# Patient Record
Sex: FEMALE | Race: WHITE | Employment: UNEMPLOYED | URBAN - METROPOLITAN AREA
[De-identification: names, ages, dates, MRNs, and addresses within clinical notes are randomized per-mention and may not be internally consistent; named-entity substitution may affect disease eponyms.]

---

## 2017-09-19 ENCOUNTER — APPOINTMENT (OUTPATIENT)
Dept: GENERAL RADIOLOGY | Facility: HOSPITAL | Age: 37
End: 2017-09-19

## 2017-09-19 ENCOUNTER — HOSPITAL ENCOUNTER (OUTPATIENT)
Facility: HOSPITAL | Age: 37
Setting detail: OBSERVATION
Discharge: HOME | End: 2017-09-20
Admitting: HOSPITALIST

## 2017-09-19 DIAGNOSIS — R94.31 ABNORMAL ECG: ICD-10-CM

## 2017-09-19 DIAGNOSIS — R00.2 PALPITATIONS: Primary | ICD-10-CM

## 2017-09-19 LAB
ANION GAP SERPL CALC-SCNC: 8 MMOL/L (ref 0–18)
BASOPHILS # BLD: 0.1 K/UL (ref 0–0.2)
BASOPHILS NFR BLD: 1 %
BUN SERPL-MCNC: 7 MG/DL (ref 8–20)
BUN/CREAT SERPL: 10.1 (ref 10–20)
CALCIUM SERPL-MCNC: 8.8 MG/DL (ref 8.5–10.5)
CHLORIDE SERPL-SCNC: 102 MMOL/L (ref 95–110)
CHOLEST SERPL-MCNC: 149 MG/DL (ref 110–200)
CO2 SERPL-SCNC: 24 MMOL/L (ref 22–32)
CREAT SERPL-MCNC: 0.69 MG/DL (ref 0.5–1.5)
D DIMER PPP FEU-MCNC: <0.27 MCG/ML (ref ?–0.5)
EOSINOPHIL # BLD: 0.1 K/UL (ref 0–0.7)
EOSINOPHIL NFR BLD: 1 %
ERYTHROCYTE [DISTWIDTH] IN BLOOD BY AUTOMATED COUNT: 12.6 % (ref 11–15)
GLUCOSE SERPL-MCNC: 103 MG/DL (ref 70–99)
HCT VFR BLD AUTO: 42.9 % (ref 35–48)
HDLC SERPL-MCNC: 50 MG/DL
HGB BLD-MCNC: 14.8 G/DL (ref 12–16)
LDLC SERPL CALC-MCNC: 87 MG/DL (ref 0–99)
LYMPHOCYTES # BLD: 1.9 K/UL (ref 1–4)
LYMPHOCYTES NFR BLD: 24 %
MCH RBC QN AUTO: 30.8 PG (ref 27–32)
MCHC RBC AUTO-ENTMCNC: 34.5 G/DL (ref 32–37)
MCV RBC AUTO: 89.3 FL (ref 80–100)
MONOCYTES # BLD: 0.5 K/UL (ref 0–1)
MONOCYTES NFR BLD: 7 %
NEUTROPHILS # BLD AUTO: 5.5 K/UL (ref 1.8–7.7)
NEUTROPHILS NFR BLD: 68 %
NONHDLC SERPL-MCNC: 99 MG/DL
OSMOLALITY UR CALC.SUM OF ELEC: 276 MOSM/KG (ref 275–295)
PLATELET # BLD AUTO: 169 K/UL (ref 140–400)
PMV BLD AUTO: 8.2 FL (ref 7.4–10.3)
POTASSIUM SERPL-SCNC: 3.8 MMOL/L (ref 3.3–5.1)
RBC # BLD AUTO: 4.8 M/UL (ref 3.7–5.4)
SODIUM SERPL-SCNC: 134 MMOL/L (ref 136–144)
TRIGL SERPL-MCNC: 61 MG/DL (ref 1–149)
TROPONIN I SERPL-MCNC: 0 NG/ML (ref ?–0.03)
TROPONIN I SERPL-MCNC: 0 NG/ML (ref ?–0.03)
WBC # BLD AUTO: 8.1 K/UL (ref 4–11)

## 2017-09-19 PROCEDURE — 96360 HYDRATION IV INFUSION INIT: CPT

## 2017-09-19 PROCEDURE — 96361 HYDRATE IV INFUSION ADD-ON: CPT

## 2017-09-19 PROCEDURE — 93010 ELECTROCARDIOGRAM REPORT: CPT | Performed by: NURSE PRACTITIONER

## 2017-09-19 PROCEDURE — 85379 FIBRIN DEGRADATION QUANT: CPT | Performed by: NURSE PRACTITIONER

## 2017-09-19 PROCEDURE — 99285 EMERGENCY DEPT VISIT HI MDM: CPT

## 2017-09-19 PROCEDURE — 71020 XR CHEST PA + LAT CHEST (CPT=71020): CPT

## 2017-09-19 PROCEDURE — 93010 ELECTROCARDIOGRAM REPORT: CPT | Performed by: INTERNAL MEDICINE

## 2017-09-19 PROCEDURE — 85025 COMPLETE CBC W/AUTO DIFF WBC: CPT

## 2017-09-19 PROCEDURE — 80048 BASIC METABOLIC PNL TOTAL CA: CPT

## 2017-09-19 PROCEDURE — 80061 LIPID PANEL: CPT | Performed by: HOSPITALIST

## 2017-09-19 PROCEDURE — 93005 ELECTROCARDIOGRAM TRACING: CPT

## 2017-09-19 PROCEDURE — 84484 ASSAY OF TROPONIN QUANT: CPT

## 2017-09-19 RX ORDER — HEPARIN SODIUM 5000 [USP'U]/ML
5000 INJECTION, SOLUTION INTRAVENOUS; SUBCUTANEOUS EVERY 12 HOURS SCHEDULED
Status: DISCONTINUED | OUTPATIENT
Start: 2017-09-19 | End: 2017-09-20

## 2017-09-19 RX ORDER — PAROXETINE 30 MG/1
30 TABLET, FILM COATED ORAL EVERY MORNING
COMMUNITY
End: 2017-09-20

## 2017-09-19 RX ORDER — ASPIRIN 325 MG
325 TABLET ORAL ONCE
Status: COMPLETED | OUTPATIENT
Start: 2017-09-19 | End: 2017-09-19

## 2017-09-19 RX ORDER — ACETAMINOPHEN 325 MG/1
650 TABLET ORAL EVERY 6 HOURS PRN
Status: DISCONTINUED | OUTPATIENT
Start: 2017-09-19 | End: 2017-09-20

## 2017-09-19 RX ORDER — NICOTINE 21 MG/24HR
1 PATCH, TRANSDERMAL 24 HOURS TRANSDERMAL DAILY
Status: DISCONTINUED | OUTPATIENT
Start: 2017-09-19 | End: 2017-09-20

## 2017-09-19 RX ORDER — POTASSIUM CHLORIDE 20 MEQ/1
40 TABLET, EXTENDED RELEASE ORAL ONCE
Status: COMPLETED | OUTPATIENT
Start: 2017-09-19 | End: 2017-09-19

## 2017-09-19 RX ORDER — SODIUM CHLORIDE 0.9 % (FLUSH) 0.9 %
3 SYRINGE (ML) INJECTION AS NEEDED
Status: DISCONTINUED | OUTPATIENT
Start: 2017-09-19 | End: 2017-09-20

## 2017-09-19 RX ORDER — METHADONE HYDROCHLORIDE 10 MG/1
58 TABLET ORAL DAILY
COMMUNITY

## 2017-09-19 RX ORDER — ONDANSETRON 2 MG/ML
4 INJECTION INTRAMUSCULAR; INTRAVENOUS EVERY 6 HOURS PRN
Status: DISCONTINUED | OUTPATIENT
Start: 2017-09-19 | End: 2017-09-20

## 2017-09-19 RX ORDER — DOXEPIN HYDROCHLORIDE 50 MG/1
1 CAPSULE ORAL DAILY
COMMUNITY

## 2017-09-19 RX ORDER — SODIUM CHLORIDE 9 MG/ML
INJECTION, SOLUTION INTRAVENOUS CONTINUOUS
Status: DISCONTINUED | OUTPATIENT
Start: 2017-09-19 | End: 2017-09-20

## 2017-09-19 NOTE — H&P
[facility] Hospitalist H&P       CC: Patient presents with:  Arrythmia/Palpitations (cardiovascular)     PCP: None Pcp    ASSESSMENT / PLAN:     Ms. Ayse Schmid julia  30 yo F with with PMH of prior opioid abuse, now on methadone and anxiety who presented with palpit have sinus tachycardia in EMS but NSR initially in ED. Feels like HR has improved since arriving to ED. Troponin negative, initial EKG with NSR, repeat EKG with short KS. Cardiology called NP and recommended further evaluation.  No family hx of sudden cardi 8.8   NA  134*   K  3.8   CL  102   CO2  24       Lab Results  Component Value Date   WBC 8.1 09/19/2017   HGB 14.8 09/19/2017   HCT 42.9 09/19/2017    09/19/2017   CREATSERUM 0.69 09/19/2017   BUN 7 09/19/2017    09/19/2017   K 3.8 09/19/2017

## 2017-09-19 NOTE — ED NOTES
Pt states took first dose of Paxil this AM at 0700, fell asleep and when woke felt heart racing, clammy, and lightheaded. Arrived to triage per EMS, states feels improvement but not completely.

## 2017-09-19 NOTE — ED PROVIDER NOTES
Patient Seen in: [facility] AND [facility] Emergency Department    History   CC: rapid heart beat  HPI: Eliel Rondon 29year old female with a history of anxiety, depression and opiate addiction, currently on methadone who presents to the ER c/o feeling as th Vitals [09/19/17 1347]  BP: 139/64  Pulse: 70  Resp: 18  Temp: 98.3 °F (36.8 °C)  Temp src: Oral  SpO2: 98 %  O2 Device: None (Room air)    Current:/74   Pulse 98   Temp 98.3 °F (36.8 °C) (Oral)   Resp 10   Ht 170.2 cm (5' 7\")   Wt 61.2 kg   LMP 08/ normal sinus rhythm and sinus tachycardia as high as 120bpm.  Patient is not orthostatic in the emergency department. Normal trop x2 in the ER, however EKG 2hr shows subtle st depression in inferolateral leads.  Dr. Junito Washington, cardiology called to state pt also

## 2017-09-19 NOTE — ED INITIAL ASSESSMENT (HPI)
Pt presents to ED via EMS for c/o rapid HR. Pt states she took first dose of plaxil (30 mg). Felt clammy and generally weak after taking med. Per EMS pt was sinus tach but returned to NSR before arrival to ED. BP stable.

## 2017-09-20 ENCOUNTER — APPOINTMENT (OUTPATIENT)
Dept: CV DIAGNOSTICS | Facility: HOSPITAL | Age: 37
End: 2017-09-20
Attending: INTERNAL MEDICINE

## 2017-09-20 VITALS
OXYGEN SATURATION: 96 % | WEIGHT: 129.63 LBS | DIASTOLIC BLOOD PRESSURE: 78 MMHG | RESPIRATION RATE: 18 BRPM | TEMPERATURE: 98 F | BODY MASS INDEX: 20.35 KG/M2 | HEART RATE: 83 BPM | HEIGHT: 67 IN | SYSTOLIC BLOOD PRESSURE: 129 MMHG

## 2017-09-20 LAB
ANION GAP SERPL CALC-SCNC: 5 MMOL/L (ref 0–18)
BASOPHILS # BLD: 0.1 K/UL (ref 0–0.2)
BASOPHILS NFR BLD: 1 %
BUN SERPL-MCNC: 6 MG/DL (ref 8–20)
BUN/CREAT SERPL: 7.7 (ref 10–20)
CALCIUM SERPL-MCNC: 8.6 MG/DL (ref 8.5–10.5)
CHLORIDE SERPL-SCNC: 107 MMOL/L (ref 95–110)
CHOLEST SERPL-MCNC: 143 MG/DL (ref 110–200)
CO2 SERPL-SCNC: 27 MMOL/L (ref 22–32)
CREAT SERPL-MCNC: 0.78 MG/DL (ref 0.5–1.5)
EOSINOPHIL # BLD: 0.1 K/UL (ref 0–0.7)
EOSINOPHIL NFR BLD: 1 %
ERYTHROCYTE [DISTWIDTH] IN BLOOD BY AUTOMATED COUNT: 12.7 % (ref 11–15)
GLUCOSE SERPL-MCNC: 87 MG/DL (ref 70–99)
HCT VFR BLD AUTO: 41.2 % (ref 35–48)
HDLC SERPL-MCNC: 47 MG/DL
HGB BLD-MCNC: 13.9 G/DL (ref 12–16)
LDLC SERPL CALC-MCNC: 81 MG/DL (ref 0–99)
LYMPHOCYTES # BLD: 3.1 K/UL (ref 1–4)
LYMPHOCYTES NFR BLD: 30 %
MAGNESIUM SERPL-MCNC: 1.8 MG/DL (ref 1.8–2.5)
MCH RBC QN AUTO: 30.2 PG (ref 27–32)
MCHC RBC AUTO-ENTMCNC: 33.8 G/DL (ref 32–37)
MCV RBC AUTO: 89.5 FL (ref 80–100)
MONOCYTES # BLD: 0.9 K/UL (ref 0–1)
MONOCYTES NFR BLD: 9 %
NEUTROPHILS # BLD AUTO: 6.1 K/UL (ref 1.8–7.7)
NEUTROPHILS NFR BLD: 59 %
NONHDLC SERPL-MCNC: 96 MG/DL
OSMOLALITY UR CALC.SUM OF ELEC: 285 MOSM/KG (ref 275–295)
PLATELET # BLD AUTO: 164 K/UL (ref 140–400)
PMV BLD AUTO: 9.3 FL (ref 7.4–10.3)
POTASSIUM SERPL-SCNC: 4.1 MMOL/L (ref 3.3–5.1)
POTASSIUM SERPL-SCNC: 4.1 MMOL/L (ref 3.3–5.1)
RBC # BLD AUTO: 4.61 M/UL (ref 3.7–5.4)
SODIUM SERPL-SCNC: 139 MMOL/L (ref 136–144)
TRIGL SERPL-MCNC: 73 MG/DL (ref 1–149)
TROPONIN I SERPL-MCNC: 0 NG/ML (ref ?–0.03)
TROPONIN I SERPL-MCNC: 0 NG/ML (ref ?–0.03)
TSH SERPL-ACNC: 1.19 UIU/ML (ref 0.45–5.33)
WBC # BLD AUTO: 10.3 K/UL (ref 4–11)

## 2017-09-20 PROCEDURE — 93306 TTE W/DOPPLER COMPLETE: CPT | Performed by: INTERNAL MEDICINE

## 2017-09-20 PROCEDURE — 84484 ASSAY OF TROPONIN QUANT: CPT | Performed by: INTERNAL MEDICINE

## 2017-09-20 PROCEDURE — 83735 ASSAY OF MAGNESIUM: CPT | Performed by: INTERNAL MEDICINE

## 2017-09-20 PROCEDURE — 93005 ELECTROCARDIOGRAM TRACING: CPT

## 2017-09-20 PROCEDURE — 84132 ASSAY OF SERUM POTASSIUM: CPT | Performed by: HOSPITALIST

## 2017-09-20 PROCEDURE — 80048 BASIC METABOLIC PNL TOTAL CA: CPT | Performed by: HOSPITALIST

## 2017-09-20 PROCEDURE — 93010 ELECTROCARDIOGRAM REPORT: CPT | Performed by: INTERNAL MEDICINE

## 2017-09-20 PROCEDURE — 80061 LIPID PANEL: CPT | Performed by: INTERNAL MEDICINE

## 2017-09-20 PROCEDURE — 84443 ASSAY THYROID STIM HORMONE: CPT | Performed by: INTERNAL MEDICINE

## 2017-09-20 PROCEDURE — 85025 COMPLETE CBC W/AUTO DIFF WBC: CPT | Performed by: HOSPITALIST

## 2017-09-20 RX ORDER — METHADONE HYDROCHLORIDE 10 MG/5ML
58 SOLUTION ORAL DAILY
Status: DISCONTINUED | OUTPATIENT
Start: 2017-09-20 | End: 2017-09-20

## 2017-09-20 RX ORDER — METHADONE HYDROCHLORIDE 10 MG/1
58 TABLET ORAL DAILY
Status: DISCONTINUED | OUTPATIENT
Start: 2017-09-20 | End: 2017-09-20

## 2017-09-20 RX ORDER — MAGNESIUM OXIDE 400 MG (241.3 MG MAGNESIUM) TABLET
400 TABLET ONCE
Status: COMPLETED | OUTPATIENT
Start: 2017-09-20 | End: 2017-09-20

## 2017-09-20 NOTE — PROGRESS NOTES
[facility]    Progress Note    Ish Jones Patient Status:  Observation    1980 MRN [id number]   Location [facility] Attending Morgan Tyler MD   Hosp Day # 0 PCP No primary care provider on file.          Assessme 4.1 09/20/2017    09/20/2017   CO2 27 09/20/2017   GLU 87 09/20/2017   CA 8.6 09/20/2017   TSH 1.19 09/20/2017   DDIMER <0.27 09/19/2017   MG 1.8 09/20/2017   TROP 0.00 09/20/2017       Xr Chest Pa + Lat Chest ([id number])    Result Date: 9/19/2017  C

## 2017-09-20 NOTE — DISCHARGE SUMMARY
General Medicine Discharge Summary     Patient ID:  Chelsea Nettles  29year old  11/20/1980    Admit date: 9/19/2017    Discharge date and time: 09/20/17    Attending Physician: Archie Ramos MD     Primary Care Physician: No primary care provider on monitor overnight with NSR  - cardiology consulted, recommended TTE which wasn ormal     Hx of opioid abuse on methadone  - continue home methadone dose 58 mg daily  [facility]- will ask RN to call in AM to verify dose     Anxiety  - recently started opportunity to ask questions and state understand and agree with therapeutic plan as outlined      Clifton Johnston MD  [facility] Hospitalist

## 2017-09-20 NOTE — CONSULTS
Cardiology Consult Note     PRIMARY CARDIOLOGIST: [facility]      CONSULT FOR: PALP, TACHY, MURMUR      HISTORY: 35 Y/O FEMALE WITH ADMIT FOR TACHY/PALP AFTER STARTING ON PAXIL FOR ANXIETY /DEPRESSIOM.  HAS OPIOID ABUS3 HX AND IS ON METHADONE.  ECG IS NORMAL AND T

## 2017-09-20 NOTE — PLAN OF CARE
Spoke to 52 Ian Keller @ [facility] [phone]; verified methadone dose of 58mg daily

## 2017-09-20 NOTE — PLAN OF CARE
CARDIOVASCULAR - ADULT    • Maintains optimal cardiac output and hemodynamic stability Adequate for Discharge    • Absence of cardiac arrhythmias or at baseline    DENIES CHEST PAIN AT THIS TIME.  2D ECHO DONE Adequate for Discharge        Patient/Family Go

## 2017-09-20 NOTE — PAYOR COMM NOTE
--------------  ADMISSION REVIEW     Payor: N/A  Subscriber #:  No Subscriber Number on File  Authorization Number: N/A    Admit date: N/A  Admit time: N/A       Admitting Physician: Luciana Best MD  Attending Physician:  Luciana Best MD  [facility] Smoker                                                   Packs/day: 0.50      Years: 0.00         Types: Cigarettes  Smokeless tobacco: Never Used[HL. 3]                          ROS:  Review of Systems    Positive for stated complaint: Rapid heart rate   O Normal   CBC WITH DIFFERENTIAL WITH PLATELET    Narrative: The following orders were created for panel order CBC WITH DIFFERENTIAL WITH PLATELET.   Procedure                               Abnormality         Status                     --------- by ELIZA Modi on 9/19/2017  7:58 PM   Attribution [place]     HL. 1 - ELIZA Modi on 9/19/2017  5:29 PM   HL. 2 - ELIZA Modi on 9/19/2017  7:51 PM   HL. 3 - ELIZA Modi on 9/19/2017  7:58 PM   HL. 4 - ELIZA Modi agreeing with therapeutic plan as outlined    Elias Orourke MD  [facility] Hospitalist  Answering Service number: [phone]    HPI       History of Present Illness:      Ms. Raquel Blum julia  30 yo F with with PMH of prior opioid abuse, now on methadone and anx Supple, no JVD  Pulm: CTAB, no crackles or wheezes  CV: tachycardic, regular rhythm, no murmurs, 2+ peripheral pulses  ABD: Soft, non-tender, non-distended, +BS  MSK: strength 5/5 in all extremities  Neuro: Grossly normal, CN intact, sensory intact  Psych: Saline Flush 0.9 % injection 3 mL     Date Action Dose Route User    9/19/2017 2153 Given 3 mL Intravenous Nadia Dumas, RN      Potassium Chloride ER (K-DUR M20) CR tab 40 mEq     Date Action Dose Route User    9/19/2017 2152 Given 40 mEq Oral Andrea Deck, Ad

## 2017-09-20 NOTE — CONSULTS
[facility]    PATIENT'S NAME: Luke Ramirezvaleriano   ATTENDING PHYSICIAN: López Garay. Mandi Amezcua MD   CONSULTING PHYSICIAN: Bubba Dick.  Laisha Li MD   PATIENT ACCOUNT#:   [id number]    LOCATION:  [address] [place] [address] Road #:   [id number]       DATE OF BIR Started on Paxil. Having tachyarrhythmias. Looks like sinus rhythm and sinus tachycardia. EKG is normal except for a short NH interval.  Troponins are normal.  Electrolytes show potassium is slightly low. Patient will be followed on telemetry.   Pop King

## 2017-09-20 NOTE — PLAN OF CARE
CARDIOVASCULAR - ADULT    • Maintains optimal cardiac output and hemodynamic stability Progressing    • Absence of cardiac arrhythmias or at baseline Progressing        SR on monitor   2decho ordered for today   Troponin negative   IVF as ordered   RN to v

## 2017-09-20 NOTE — DISCHARGE PLANNING
SW contacted financial services - pt to be screened for possible public aid.     Elli Peres, [address]

## 2017-09-23 NOTE — ED NOTES
Patient arrives to ED, appears anxious, tearful c/o palpitations and lower abdominal pain. She states she was admitted her overnight on Tuesday for palpitations and has an outpatient stress test in 2 weeks.  Patient was taken off her paxil medication and is

## 2017-09-23 NOTE — ED NOTES
Patient provided discharge instruction with understanding verbalized.  Patient ambulated from ED with steady gait

## 2017-09-23 NOTE — ED PROVIDER NOTES
Patient Seen in: [facility] AND [facility] Emergency Department    History   Patient presents with:  Arrythmia/Palpitations (cardiovascular)    Stated Complaint: palpitations    HPI    Patient is a 41-year-old female who presents to the emergency department with Pupils are equal, round, and reactive to light. Neck: Neck supple. Cardiovascular: Normal rate, regular rhythm and normal heart sounds. Pulmonary/Chest: Effort normal.   Abdominal: Soft. Bowel sounds are normal. She exhibits no distension.  There is LAVENDER TALL (BNP)     EKG    Rate, intervals and axes as noted on EKG Report.   Rate: 90  Rhythm: Sinus Rhythm  Reading: Short MN otherwise normal           ============================================================  ED Course  -------------------------

## 2017-09-23 NOTE — ED INITIAL ASSESSMENT (HPI)
Pt admitted here for similar complaint of \"heart beating fast\" 9/19/2017 and was discharged home the following day. sts since her discharge has continued to have episodes of rapid heart beating and sts her legs feel numb. Pt does c/o increased anxiety.

## 2017-12-27 ENCOUNTER — HOSPITAL ENCOUNTER (EMERGENCY)
Facility: HOSPITAL | Age: 37
Discharge: HOME | End: 2017-12-27

## 2017-12-27 VITALS
BODY MASS INDEX: 23.54 KG/M2 | TEMPERATURE: 99 F | SYSTOLIC BLOOD PRESSURE: 125 MMHG | HEIGHT: 67 IN | DIASTOLIC BLOOD PRESSURE: 69 MMHG | RESPIRATION RATE: 20 BRPM | WEIGHT: 150 LBS | OXYGEN SATURATION: 99 % | HEART RATE: 80 BPM

## 2017-12-27 DIAGNOSIS — H60.502 ACUTE OTITIS EXTERNA OF LEFT EAR, UNSPECIFIED TYPE: Primary | ICD-10-CM

## 2017-12-27 PROCEDURE — 99283 EMERGENCY DEPT VISIT LOW MDM: CPT

## 2017-12-27 NOTE — ED PROVIDER NOTES
Patient Seen in: [facility] AND [facility] Emergency Department    History   Patient presents with:  Ear Problem Pain (neurosensory)    Stated Complaint: ear infection    HPI    15-year-old female, with a history of anxiety and depression, presents to the emerge Conjunctivae and EOM are normal.   Neck: Neck supple. Cardiovascular: Normal rate. No murmur heard. Pulmonary/Chest: Effort normal.   Musculoskeletal: She exhibits no tenderness. Neurological: She is alert and oriented to person, place, and time.

## 2022-02-14 ENCOUNTER — HOSPITAL ENCOUNTER (EMERGENCY)
Facility: HOSPITAL | Age: 42
Discharge: HOME | End: 2022-02-14

## 2022-02-14 VITALS
WEIGHT: 160 LBS | HEIGHT: 67 IN | HEART RATE: 66 BPM | DIASTOLIC BLOOD PRESSURE: 78 MMHG | OXYGEN SATURATION: 98 % | TEMPERATURE: 98 F | RESPIRATION RATE: 18 BRPM | SYSTOLIC BLOOD PRESSURE: 132 MMHG | BODY MASS INDEX: 25.11 KG/M2

## 2022-02-14 DIAGNOSIS — L60.0 INGROWN TOENAIL: Primary | ICD-10-CM

## 2022-02-14 PROCEDURE — 99283 EMERGENCY DEPT VISIT LOW MDM: CPT

## 2022-02-14 PROCEDURE — 11765 WEDGE EXCISION SKN NAIL FOLD: CPT

## 2022-02-14 RX ORDER — LIDOCAINE HYDROCHLORIDE AND EPINEPHRINE 20; 5 MG/ML; UG/ML
10 INJECTION, SOLUTION EPIDURAL; INFILTRATION; INTRACAUDAL; PERINEURAL ONCE
Status: COMPLETED | OUTPATIENT
Start: 2022-02-14 | End: 2022-02-14

## 2022-02-14 RX ORDER — PRAZOSIN HYDROCHLORIDE 1 MG/1
1 CAPSULE ORAL NIGHTLY
COMMUNITY

## 2022-02-15 NOTE — ED INITIAL ASSESSMENT (HPI)
Pt reports a hx of ingrown toenail 1st digit right foot. Pt has an ingrown nail again and her son accidentally bumped it and now the pain has increased, there's more swelling and redness.

## (undated) NOTE — ED AVS SNAPSHOT
Catrachito Caceres   MRN: [id number]    Department:  [facility] Emergency Department   Date of Visit:  9/19/2017           Disclosure     Insurance plans vary and the physician(s) referred by the ER may not be covered by your plan.  Please contact CARE PHYSICIAN AT ONCE OR RETURN IMMEDIATELY TO THE EMERGENCY DEPARTMENT. If you have been prescribed any medication(s), please fill your prescription right away and begin taking the medication(s) as directed.   If you believe that any of the medications

## (undated) NOTE — ED AVS SNAPSHOT
Pauline Green   MRN: [id number]    Department:  [facility] Emergency Department   Date of Visit:  12/27/2017           Disclosure     Insurance plans vary and the physician(s) referred by the ER may not be covered by your plan.  Please contact CARE PHYSICIAN AT ONCE OR RETURN IMMEDIATELY TO THE EMERGENCY DEPARTMENT. If you have been prescribed any medication(s), please fill your prescription right away and begin taking the medication(s) as directed.   If you believe that any of the medications